# Patient Record
Sex: FEMALE | Race: WHITE | URBAN - METROPOLITAN AREA
[De-identification: names, ages, dates, MRNs, and addresses within clinical notes are randomized per-mention and may not be internally consistent; named-entity substitution may affect disease eponyms.]

---

## 2017-06-30 ENCOUNTER — IMPORTED ENCOUNTER (OUTPATIENT)
Dept: URBAN - METROPOLITAN AREA CLINIC 9 | Facility: CLINIC | Age: 67
End: 2017-06-30

## 2018-06-01 ENCOUNTER — IMPORTED ENCOUNTER (OUTPATIENT)
Dept: URBAN - METROPOLITAN AREA CLINIC 9 | Facility: CLINIC | Age: 68
End: 2018-06-01

## 2018-06-08 ENCOUNTER — IMPORTED ENCOUNTER (OUTPATIENT)
Dept: URBAN - METROPOLITAN AREA CLINIC 9 | Facility: CLINIC | Age: 68
End: 2018-06-08

## 2018-07-13 ENCOUNTER — IMPORTED ENCOUNTER (OUTPATIENT)
Dept: URBAN - METROPOLITAN AREA CLINIC 9 | Facility: CLINIC | Age: 68
End: 2018-07-13

## 2018-12-11 ENCOUNTER — IMPORTED ENCOUNTER (OUTPATIENT)
Dept: URBAN - METROPOLITAN AREA CLINIC 9 | Facility: CLINIC | Age: 68
End: 2018-12-11

## 2019-09-13 ENCOUNTER — IMPORTED ENCOUNTER (OUTPATIENT)
Dept: URBAN - METROPOLITAN AREA CLINIC 9 | Facility: CLINIC | Age: 69
End: 2019-09-13

## 2019-10-11 ENCOUNTER — IMPORTED ENCOUNTER (OUTPATIENT)
Dept: URBAN - METROPOLITAN AREA CLINIC 9 | Facility: CLINIC | Age: 69
End: 2019-10-11

## 2020-03-06 ENCOUNTER — IMPORTED ENCOUNTER (OUTPATIENT)
Dept: URBAN - METROPOLITAN AREA CLINIC 9 | Facility: CLINIC | Age: 70
End: 2020-03-06

## 2020-09-04 ENCOUNTER — IMPORTED ENCOUNTER (OUTPATIENT)
Dept: URBAN - METROPOLITAN AREA CLINIC 9 | Facility: CLINIC | Age: 70
End: 2020-09-04

## 2021-05-26 ENCOUNTER — IMPORTED ENCOUNTER (OUTPATIENT)
Dept: URBAN - METROPOLITAN AREA CLINIC 9 | Facility: CLINIC | Age: 71
End: 2021-05-26

## 2021-06-15 ENCOUNTER — IMPORTED ENCOUNTER (OUTPATIENT)
Dept: URBAN - METROPOLITAN AREA CLINIC 9 | Facility: CLINIC | Age: 71
End: 2021-06-15

## 2021-10-16 ASSESSMENT — KERATOMETRY
OD_AXISANGLE_DEGREES: 87
OS_AXISANGLE2_DEGREES: 173
OS_AXISANGLE2_DEGREES: 175
OS_K2POWER_DIOPTERS: 44.75
OD_K1POWER_DIOPTERS: 43.75
OS_AXISANGLE_DEGREES: 83
OS_AXISANGLE_DEGREES: 85
OS_K1POWER_DIOPTERS: 43.5
OS_K1POWER_DIOPTERS: 43.75
OS_K1POWER_DIOPTERS: 43.25
OD_AXISANGLE_DEGREES: 83
OD_AXISANGLE2_DEGREES: 177
OS_K1POWER_DIOPTERS: 43.375
OD_K1POWER_DIOPTERS: 43.5
OD_K2POWER_DIOPTERS: 44.75
OD_AXISANGLE2_DEGREES: 177
OS_K2POWER_DIOPTERS: 44.75
OD_K1POWER_DIOPTERS: 43.5
OS_K1POWER_DIOPTERS: 43.5
OS_AXISANGLE_DEGREES: 89
OS_AXISANGLE2_DEGREES: 176
OS_K1POWER_DIOPTERS: 43.25
OD_K2POWER_DIOPTERS: 44.75
OD_K2POWER_DIOPTERS: 44.75
OS_AXISANGLE2_DEGREES: 175
OS_AXISANGLE2_DEGREES: 175
OD_AXISANGLE2_DEGREES: 173
OD_K2POWER_DIOPTERS: 44.75
OS_K2POWER_DIOPTERS: 45
OD_K1POWER_DIOPTERS: 43.5
OD_AXISANGLE2_DEGREES: 1
OS_K2POWER_DIOPTERS: 44.75
OD_AXISANGLE_DEGREES: 83
OD_K1POWER_DIOPTERS: 43.5
OD_K2POWER_DIOPTERS: 44.625
OD_AXISANGLE_DEGREES: 91
OS_K2POWER_DIOPTERS: 44.75
OD_K2POWER_DIOPTERS: 44.75
OD_AXISANGLE_DEGREES: 88
OS_K2POWER_DIOPTERS: 44.75
OS_AXISANGLE_DEGREES: 85
OS_AXISANGLE2_DEGREES: 179
OD_AXISANGLE2_DEGREES: 173
OD_AXISANGLE2_DEGREES: 178
OS_AXISANGLE_DEGREES: 85
OD_AXISANGLE_DEGREES: 87
OS_AXISANGLE_DEGREES: 86
OD_K1POWER_DIOPTERS: 43.5

## 2021-10-16 ASSESSMENT — VISUAL ACUITY
OD_CC: 20/20 - SN
OD_CC: 20/50 SN
OS_SC: 20/70 - SN
OD_SC: 20/40 SN
OD_CC: 20/25 SN
OD_SC: 20/70 + SN
OS_PH: 20/25 SN
OD_PH: 20/30 + SN
OD_CC: 20/30 SN
OD_CC: 20/30 -2 SN
OS_CC: 20/25 -2 SN
OS_CC: 20/25 - SN
OD_SC: 20/40 - SN
OS_SC: 20/70 SN
OS_CC: 20/20 -2 SN
OD_CC: 20/20 -2 SN
OS_SC: 20/50 + SN
OS_CC: 20/20 -2 SN
OS_SC: 20/100 SN
OS_CC: 20/20 - SN
OS_CC: 20/30 +2 SN
OS_SC: 20/80 - SN
OS_CC: 20/30 SN
OD_SC: 20/70 - SN
OD_CC: 20/25 - SN
OS_CC: 20/20 SN
OD_CC: 20/30 + SN
OS_CC: 20/30 -2 SN
OS_CC: 20/50 - SN
OS_CC: 20/30 - SN
OS_CC: 20/25 -2 SN
OD_SC: 20/70 - SN
OD_CC: 20/25 - SN
OS_CC: 20/30 + SN
OD_CC: 20/25 SN
OS_SC: 20/40 -2 SN
OS_CC: 20/30 + SN
OD_CC: 20/25 - SN
OD_CC: 20/20 -2 SN
OD_SC: 20/100 + SN
OD_CC: 20/25 + SN

## 2021-10-16 ASSESSMENT — PACHYMETRY
OD_CT_UM: 616.0
OS_CT_UM: 611.0

## 2021-10-16 ASSESSMENT — TONOMETRY
OD_IOP_MMHG: 11
OS_IOP_MMHG: 19
OD_IOP_MMHG: 12
OD_IOP_MMHG: 20
OS_IOP_MMHG: 11
OD_IOP_MMHG: 17
OS_IOP_MMHG: 13
OD_IOP_MMHG: 16
OS_IOP_MMHG: 16
OD_IOP_MMHG: 12
OD_IOP_MMHG: 16
OS_IOP_MMHG: 13
OS_IOP_MMHG: 15
OD_IOP_MMHG: 15
OS_IOP_MMHG: 16
OS_IOP_MMHG: 21
OS_IOP_MMHG: 17
OD_IOP_MMHG: 14

## 2022-06-17 RX ORDER — ASPIRIN 325 MG
TABLET ORAL
COMMUNITY

## 2022-06-17 RX ORDER — LEVOTHYROXINE SODIUM 88 UG/1
TABLET ORAL
COMMUNITY
End: 2022-09-14 | Stop reason: SDUPTHER

## 2022-06-17 RX ORDER — LEVALBUTEROL INHALATION SOLUTION 0.63 MG/3ML
SOLUTION RESPIRATORY (INHALATION)
COMMUNITY

## 2022-06-17 RX ORDER — MOMETASONE FUROATE 50 UG/1
SPRAY, METERED NASAL
COMMUNITY

## 2022-06-17 RX ORDER — FLUTICASONE PROPIONATE 220 UG/1
1 AEROSOL, METERED RESPIRATORY (INHALATION)
COMMUNITY

## 2022-06-17 RX ORDER — AZELASTINE 1 MG/ML
SPRAY, METERED NASAL
COMMUNITY
Start: 2021-11-15

## 2022-06-17 RX ORDER — LIDOCAINE 50 MG/G
PATCH TOPICAL
COMMUNITY
Start: 2019-10-25

## 2022-06-17 RX ORDER — LORATADINE AND PSEUDOEPHEDRINE SULFATE 5; 120 MG/1; MG/1
TABLET, EXTENDED RELEASE ORAL
COMMUNITY

## 2022-09-12 PROBLEM — N95.9 MENOPAUSAL AND POSTMENOPAUSAL DISORDER: Status: ACTIVE | Noted: 2022-09-12

## 2022-09-12 PROBLEM — E55.9 VITAMIN D DEFICIENCY: Status: ACTIVE | Noted: 2022-09-12

## 2022-09-12 PROBLEM — M70.72 BURSITIS OF LEFT HIP: Status: ACTIVE | Noted: 2022-09-12

## 2022-09-12 PROBLEM — M54.42 LUMBAGO WITH SCIATICA, LEFT SIDE: Status: ACTIVE | Noted: 2022-09-12

## 2022-09-12 PROBLEM — J32.9 CHRONIC SINUSITIS: Status: ACTIVE | Noted: 2022-09-12

## 2022-09-12 PROBLEM — M54.41 LUMBAGO WITH SCIATICA, RIGHT SIDE: Status: ACTIVE | Noted: 2022-09-12

## 2022-09-12 PROBLEM — S42.272A CLOSED TORUS FRACTURE OF UPPER END OF LEFT HUMERUS: Status: ACTIVE | Noted: 2022-09-12

## 2022-09-12 PROBLEM — M25.522 LEFT ELBOW PAIN: Status: ACTIVE | Noted: 2022-09-12

## 2022-09-12 PROBLEM — L25.9 CONTACT DERMATITIS: Status: ACTIVE | Noted: 2022-09-12

## 2022-09-12 PROBLEM — G43.909 MIGRAINE: Status: ACTIVE | Noted: 2022-09-12

## 2022-09-12 PROBLEM — G89.29 OTHER CHRONIC PAIN: Status: ACTIVE | Noted: 2022-09-12

## 2022-09-12 PROBLEM — R73.09 IMPAIRED GLUCOSE TOLERANCE TEST: Status: ACTIVE | Noted: 2022-09-12

## 2022-09-12 PROBLEM — M54.2 CERVICALGIA: Status: ACTIVE | Noted: 2022-09-12

## 2022-09-12 PROBLEM — M75.02 ADHESIVE CAPSULITIS OF LEFT SHOULDER: Status: ACTIVE | Noted: 2022-09-12

## 2022-09-12 PROBLEM — L72.3 SEBACEOUS CYST: Status: ACTIVE | Noted: 2022-09-12

## 2022-09-12 PROBLEM — J45.901 EXACERBATION OF ASTHMA: Status: ACTIVE | Noted: 2022-09-12

## 2022-09-12 PROBLEM — M79.602 PAIN IN LEFT ARM: Status: ACTIVE | Noted: 2022-09-12

## 2022-09-12 PROBLEM — R11.0 NAUSEA: Status: ACTIVE | Noted: 2022-09-12

## 2022-09-12 PROBLEM — J30.9 ALLERGIC RHINITIS: Status: ACTIVE | Noted: 2022-09-12

## 2022-09-12 PROBLEM — R22.2 MASS ON BACK: Status: ACTIVE | Noted: 2022-09-12

## 2022-09-12 PROBLEM — R20.8: Status: ACTIVE | Noted: 2022-09-12

## 2022-09-12 PROBLEM — E03.9 HYPOTHYROIDISM: Status: ACTIVE | Noted: 2022-09-12

## 2022-09-12 PROBLEM — E78.5 DYSLIPIDEMIA: Status: ACTIVE | Noted: 2022-09-12

## 2022-09-12 PROBLEM — R10.9 LEFT FLANK PAIN: Status: ACTIVE | Noted: 2022-09-12

## 2022-09-12 PROBLEM — E04.1 THYROID NODULE: Status: ACTIVE | Noted: 2022-09-12

## 2022-09-12 PROBLEM — M47.812 CERVICAL SPONDYLOSIS: Status: ACTIVE | Noted: 2022-09-12

## 2022-09-12 PROBLEM — E04.1 UNINODULAR GOITER: Status: ACTIVE | Noted: 2022-09-12

## 2022-09-12 PROBLEM — N20.0 KIDNEY STONE: Status: ACTIVE | Noted: 2022-09-12

## 2022-09-12 PROBLEM — R20.9 SKIN SENSATION DISTURBANCE: Status: ACTIVE | Noted: 2022-09-12

## 2022-10-07 PROBLEM — J45.909 ASTHMA: Status: ACTIVE | Noted: 2022-10-07

## 2024-07-08 ENCOUNTER — EMERGENCY VISIT (OUTPATIENT)
Dept: URBAN - METROPOLITAN AREA CLINIC 10 | Facility: CLINIC | Age: 74
End: 2024-07-08

## 2024-07-08 DIAGNOSIS — H10.13: ICD-10-CM

## 2024-07-08 PROCEDURE — 99214 OFFICE O/P EST MOD 30 MIN: CPT

## 2024-07-08 ASSESSMENT — VISUAL ACUITY
OS_SC: 20/30
OD_SC: 20/30

## 2024-07-08 ASSESSMENT — KERATOMETRY
OD_K2POWER_DIOPTERS: 44.75
OD_AXISANGLE_DEGREES: 87
OD_K1POWER_DIOPTERS: 43.5
OD_AXISANGLE2_DEGREES: 177
OS_AXISANGLE2_DEGREES: 175
OS_K1POWER_DIOPTERS: 43.5
OS_K2POWER_DIOPTERS: 44.75
OS_AXISANGLE_DEGREES: 85

## 2024-07-08 ASSESSMENT — TONOMETRY
OS_IOP_MMHG: 18
OD_IOP_MMHG: 17

## 2025-03-27 ENCOUNTER — APPOINTMENT (OUTPATIENT)
Dept: URBAN - METROPOLITAN AREA CLINIC 19 | Facility: CLINIC | Age: 75
Setting detail: DERMATOLOGY
End: 2025-03-27

## 2025-03-27 DIAGNOSIS — D18.0 HEMANGIOMA: ICD-10-CM

## 2025-03-27 DIAGNOSIS — D22 MELANOCYTIC NEVI: ICD-10-CM

## 2025-03-27 DIAGNOSIS — L82.1 OTHER SEBORRHEIC KERATOSIS: ICD-10-CM

## 2025-03-27 DIAGNOSIS — Z71.89 OTHER SPECIFIED COUNSELING: ICD-10-CM

## 2025-03-27 DIAGNOSIS — L57.8 OTHER SKIN CHANGES DUE TO CHRONIC EXPOSURE TO NONIONIZING RADIATION: ICD-10-CM

## 2025-03-27 DIAGNOSIS — D485 NEOPLASM OF UNCERTAIN BEHAVIOR OF SKIN: ICD-10-CM

## 2025-03-27 DIAGNOSIS — M79.6 PAIN IN LIMB, HAND, FOOT, FINGERS AND TOES: ICD-10-CM

## 2025-03-27 DIAGNOSIS — L81.4 OTHER MELANIN HYPERPIGMENTATION: ICD-10-CM

## 2025-03-27 PROBLEM — D18.01 HEMANGIOMA OF SKIN AND SUBCUTANEOUS TISSUE: Status: ACTIVE | Noted: 2025-03-27

## 2025-03-27 PROBLEM — D22.5 MELANOCYTIC NEVI OF TRUNK: Status: ACTIVE | Noted: 2025-03-27

## 2025-03-27 PROBLEM — D48.5 NEOPLASM OF UNCERTAIN BEHAVIOR OF SKIN: Status: ACTIVE | Noted: 2025-03-27

## 2025-03-27 PROBLEM — M79.662 PAIN IN LEFT LOWER LEG: Status: ACTIVE | Noted: 2025-03-27

## 2025-03-27 PROCEDURE — 99203 OFFICE O/P NEW LOW 30 MIN: CPT

## 2025-03-27 PROCEDURE — ? ADDITIONAL NOTES

## 2025-03-27 PROCEDURE — ? COUNSELING

## 2025-03-27 ASSESSMENT — LOCATION SIMPLE DESCRIPTION DERM
LOCATION SIMPLE: RIGHT CHEEK
LOCATION SIMPLE: RIGHT HAND
LOCATION SIMPLE: RIGHT LOWER BACK
LOCATION SIMPLE: LEFT FOREARM
LOCATION SIMPLE: LEFT LOWER BACK
LOCATION SIMPLE: LEFT POPLITEAL SKIN
LOCATION SIMPLE: LEFT BREAST
LOCATION SIMPLE: LEFT FOREHEAD
LOCATION SIMPLE: LEFT HAND
LOCATION SIMPLE: RIGHT FOREARM
LOCATION SIMPLE: RIGHT UPPER BACK
LOCATION SIMPLE: LEFT CHEEK

## 2025-03-27 ASSESSMENT — LOCATION DETAILED DESCRIPTION DERM
LOCATION DETAILED: RIGHT PROXIMAL DORSAL FOREARM
LOCATION DETAILED: RIGHT LATERAL MALAR CHEEK
LOCATION DETAILED: RIGHT ULNAR DORSAL HAND
LOCATION DETAILED: RIGHT INFERIOR MEDIAL MALAR CHEEK
LOCATION DETAILED: LEFT INFRAMAMMARY CREASE (OUTER QUADRANT)
LOCATION DETAILED: RIGHT MEDIAL UPPER BACK
LOCATION DETAILED: LEFT SUPERIOR FOREHEAD
LOCATION DETAILED: LEFT ULNAR DORSAL HAND
LOCATION DETAILED: RIGHT INFERIOR MEDIAL UPPER BACK
LOCATION DETAILED: LEFT INFERIOR MEDIAL MIDBACK
LOCATION DETAILED: LEFT PROXIMAL DORSAL FOREARM
LOCATION DETAILED: LEFT POPLITEAL SKIN
LOCATION DETAILED: RIGHT DISTAL DORSAL FOREARM
LOCATION DETAILED: LEFT INFERIOR CENTRAL MALAR CHEEK
LOCATION DETAILED: LEFT DISTAL DORSAL FOREARM
LOCATION DETAILED: RIGHT SUPERIOR MEDIAL MIDBACK

## 2025-03-27 ASSESSMENT — LOCATION ZONE DERM
LOCATION ZONE: LEG
LOCATION ZONE: HAND
LOCATION ZONE: TRUNK
LOCATION ZONE: ARM
LOCATION ZONE: FACE

## 2025-03-27 ASSESSMENT — PAIN INTENSITY VAS: HOW INTENSE IS YOUR PAIN 0 BEING NO PAIN, 10 BEING THE MOST SEVERE PAIN POSSIBLE?: NO PAIN
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
## 2025-06-25 ENCOUNTER — COMPREHENSIVE EXAM (OUTPATIENT)
Age: 75
End: 2025-06-25

## 2025-06-25 DIAGNOSIS — H52.223: ICD-10-CM

## 2025-06-25 DIAGNOSIS — H43.313: ICD-10-CM

## 2025-06-25 PROCEDURE — 92014 COMPRE OPH EXAM EST PT 1/>: CPT

## 2025-06-25 PROCEDURE — 92015 DETERMINE REFRACTIVE STATE: CPT

## 2025-06-25 PROCEDURE — 92250 FUNDUS PHOTOGRAPHY W/I&R: CPT
